# Patient Record
Sex: FEMALE | Race: WHITE
[De-identification: names, ages, dates, MRNs, and addresses within clinical notes are randomized per-mention and may not be internally consistent; named-entity substitution may affect disease eponyms.]

---

## 2020-07-21 ENCOUNTER — HOSPITAL ENCOUNTER (INPATIENT)
Dept: HOSPITAL 75 - LDRP | Age: 25
LOS: 2 days | Discharge: HOME | End: 2020-07-23
Attending: OBSTETRICS & GYNECOLOGY | Admitting: OBSTETRICS & GYNECOLOGY
Payer: COMMERCIAL

## 2020-07-21 VITALS — DIASTOLIC BLOOD PRESSURE: 70 MMHG | SYSTOLIC BLOOD PRESSURE: 112 MMHG

## 2020-07-21 VITALS — DIASTOLIC BLOOD PRESSURE: 80 MMHG | SYSTOLIC BLOOD PRESSURE: 117 MMHG

## 2020-07-21 VITALS — DIASTOLIC BLOOD PRESSURE: 75 MMHG | SYSTOLIC BLOOD PRESSURE: 116 MMHG

## 2020-07-21 VITALS — DIASTOLIC BLOOD PRESSURE: 72 MMHG | SYSTOLIC BLOOD PRESSURE: 111 MMHG

## 2020-07-21 VITALS — SYSTOLIC BLOOD PRESSURE: 98 MMHG | DIASTOLIC BLOOD PRESSURE: 52 MMHG

## 2020-07-21 VITALS — SYSTOLIC BLOOD PRESSURE: 134 MMHG | DIASTOLIC BLOOD PRESSURE: 80 MMHG

## 2020-07-21 VITALS — SYSTOLIC BLOOD PRESSURE: 114 MMHG | DIASTOLIC BLOOD PRESSURE: 75 MMHG

## 2020-07-21 VITALS — SYSTOLIC BLOOD PRESSURE: 110 MMHG | DIASTOLIC BLOOD PRESSURE: 65 MMHG

## 2020-07-21 VITALS — SYSTOLIC BLOOD PRESSURE: 113 MMHG | DIASTOLIC BLOOD PRESSURE: 72 MMHG

## 2020-07-21 VITALS — DIASTOLIC BLOOD PRESSURE: 62 MMHG | SYSTOLIC BLOOD PRESSURE: 146 MMHG

## 2020-07-21 VITALS — DIASTOLIC BLOOD PRESSURE: 56 MMHG | SYSTOLIC BLOOD PRESSURE: 144 MMHG

## 2020-07-21 VITALS — SYSTOLIC BLOOD PRESSURE: 109 MMHG | DIASTOLIC BLOOD PRESSURE: 71 MMHG

## 2020-07-21 VITALS — DIASTOLIC BLOOD PRESSURE: 58 MMHG | SYSTOLIC BLOOD PRESSURE: 104 MMHG

## 2020-07-21 VITALS — SYSTOLIC BLOOD PRESSURE: 112 MMHG | DIASTOLIC BLOOD PRESSURE: 56 MMHG

## 2020-07-21 VITALS — SYSTOLIC BLOOD PRESSURE: 106 MMHG | DIASTOLIC BLOOD PRESSURE: 71 MMHG

## 2020-07-21 VITALS — SYSTOLIC BLOOD PRESSURE: 135 MMHG | DIASTOLIC BLOOD PRESSURE: 61 MMHG

## 2020-07-21 VITALS — SYSTOLIC BLOOD PRESSURE: 149 MMHG | DIASTOLIC BLOOD PRESSURE: 76 MMHG

## 2020-07-21 VITALS — DIASTOLIC BLOOD PRESSURE: 78 MMHG | SYSTOLIC BLOOD PRESSURE: 113 MMHG

## 2020-07-21 VITALS — SYSTOLIC BLOOD PRESSURE: 100 MMHG | DIASTOLIC BLOOD PRESSURE: 66 MMHG

## 2020-07-21 VITALS — SYSTOLIC BLOOD PRESSURE: 126 MMHG | DIASTOLIC BLOOD PRESSURE: 60 MMHG

## 2020-07-21 VITALS — HEIGHT: 60.98 IN | WEIGHT: 160.28 LBS | BODY MASS INDEX: 30.26 KG/M2

## 2020-07-21 VITALS — SYSTOLIC BLOOD PRESSURE: 118 MMHG | DIASTOLIC BLOOD PRESSURE: 75 MMHG

## 2020-07-21 VITALS — SYSTOLIC BLOOD PRESSURE: 115 MMHG | DIASTOLIC BLOOD PRESSURE: 78 MMHG

## 2020-07-21 VITALS — DIASTOLIC BLOOD PRESSURE: 60 MMHG | SYSTOLIC BLOOD PRESSURE: 104 MMHG

## 2020-07-21 VITALS — DIASTOLIC BLOOD PRESSURE: 63 MMHG | SYSTOLIC BLOOD PRESSURE: 160 MMHG

## 2020-07-21 VITALS — SYSTOLIC BLOOD PRESSURE: 126 MMHG | DIASTOLIC BLOOD PRESSURE: 56 MMHG

## 2020-07-21 VITALS — SYSTOLIC BLOOD PRESSURE: 109 MMHG | DIASTOLIC BLOOD PRESSURE: 77 MMHG

## 2020-07-21 VITALS — SYSTOLIC BLOOD PRESSURE: 117 MMHG | DIASTOLIC BLOOD PRESSURE: 76 MMHG

## 2020-07-21 VITALS — DIASTOLIC BLOOD PRESSURE: 66 MMHG | SYSTOLIC BLOOD PRESSURE: 105 MMHG

## 2020-07-21 VITALS — DIASTOLIC BLOOD PRESSURE: 72 MMHG | SYSTOLIC BLOOD PRESSURE: 118 MMHG

## 2020-07-21 VITALS — DIASTOLIC BLOOD PRESSURE: 78 MMHG | SYSTOLIC BLOOD PRESSURE: 115 MMHG

## 2020-07-21 VITALS — SYSTOLIC BLOOD PRESSURE: 109 MMHG | DIASTOLIC BLOOD PRESSURE: 74 MMHG

## 2020-07-21 VITALS — SYSTOLIC BLOOD PRESSURE: 116 MMHG | DIASTOLIC BLOOD PRESSURE: 62 MMHG

## 2020-07-21 VITALS — SYSTOLIC BLOOD PRESSURE: 121 MMHG | DIASTOLIC BLOOD PRESSURE: 68 MMHG

## 2020-07-21 VITALS — SYSTOLIC BLOOD PRESSURE: 102 MMHG | DIASTOLIC BLOOD PRESSURE: 62 MMHG

## 2020-07-21 VITALS — DIASTOLIC BLOOD PRESSURE: 70 MMHG | SYSTOLIC BLOOD PRESSURE: 118 MMHG

## 2020-07-21 VITALS — SYSTOLIC BLOOD PRESSURE: 112 MMHG | DIASTOLIC BLOOD PRESSURE: 72 MMHG

## 2020-07-21 VITALS — DIASTOLIC BLOOD PRESSURE: 87 MMHG | SYSTOLIC BLOOD PRESSURE: 122 MMHG

## 2020-07-21 VITALS — SYSTOLIC BLOOD PRESSURE: 100 MMHG | DIASTOLIC BLOOD PRESSURE: 61 MMHG

## 2020-07-21 VITALS — DIASTOLIC BLOOD PRESSURE: 62 MMHG | SYSTOLIC BLOOD PRESSURE: 101 MMHG

## 2020-07-21 VITALS — SYSTOLIC BLOOD PRESSURE: 121 MMHG | DIASTOLIC BLOOD PRESSURE: 82 MMHG

## 2020-07-21 DIAGNOSIS — Z3A.38: ICD-10-CM

## 2020-07-21 LAB
BASOPHILS # BLD AUTO: 0 10^3/UL (ref 0–0.1)
BASOPHILS NFR BLD AUTO: 1 % (ref 0–10)
EOSINOPHIL # BLD AUTO: 0.2 10^3/UL (ref 0–0.3)
EOSINOPHIL NFR BLD AUTO: 2 % (ref 0–10)
ERYTHROCYTE [DISTWIDTH] IN BLOOD BY AUTOMATED COUNT: 14.8 % (ref 10–14.5)
HCT VFR BLD CALC: 37 % (ref 35–52)
HGB BLD-MCNC: 12.3 G/DL (ref 11.5–16)
LYMPHOCYTES # BLD AUTO: 2.3 X 10^3 (ref 1–4)
LYMPHOCYTES NFR BLD AUTO: 28 % (ref 12–44)
MANUAL DIFFERENTIAL PERFORMED BLD QL: NO
MCH RBC QN AUTO: 28 PG (ref 25–34)
MCHC RBC AUTO-ENTMCNC: 33 G/DL (ref 32–36)
MCV RBC AUTO: 83 FL (ref 80–99)
MONOCYTES # BLD AUTO: 0.6 X 10^3 (ref 0–1)
MONOCYTES NFR BLD AUTO: 8 % (ref 0–12)
NEUTROPHILS # BLD AUTO: 5.1 X 10^3 (ref 1.8–7.8)
NEUTROPHILS NFR BLD AUTO: 62 % (ref 42–75)
PLATELET # BLD: 119 10^3/UL (ref 130–400)
PMV BLD AUTO: (no result) FL (ref 7.4–10.4)
WBC # BLD AUTO: 8.2 10^3/UL (ref 4.3–11)

## 2020-07-21 PROCEDURE — 82947 ASSAY GLUCOSE BLOOD QUANT: CPT

## 2020-07-21 PROCEDURE — 36415 COLL VENOUS BLD VENIPUNCTURE: CPT

## 2020-07-21 PROCEDURE — 0HQ9XZZ REPAIR PERINEUM SKIN, EXTERNAL APPROACH: ICD-10-PCS | Performed by: OBSTETRICS & GYNECOLOGY

## 2020-07-21 PROCEDURE — 86900 BLOOD TYPING SEROLOGIC ABO: CPT

## 2020-07-21 PROCEDURE — 90715 TDAP VACCINE 7 YRS/> IM: CPT

## 2020-07-21 PROCEDURE — 0UQGXZZ REPAIR VAGINA, EXTERNAL APPROACH: ICD-10-PCS | Performed by: OBSTETRICS & GYNECOLOGY

## 2020-07-21 PROCEDURE — 3E033VJ INTRODUCTION OF OTHER HORMONE INTO PERIPHERAL VEIN, PERCUTANEOUS APPROACH: ICD-10-PCS | Performed by: OBSTETRICS & GYNECOLOGY

## 2020-07-21 PROCEDURE — 86901 BLOOD TYPING SEROLOGIC RH(D): CPT

## 2020-07-21 PROCEDURE — 86850 RBC ANTIBODY SCREEN: CPT

## 2020-07-21 PROCEDURE — 85025 COMPLETE CBC W/AUTO DIFF WBC: CPT

## 2020-07-21 PROCEDURE — 82962 GLUCOSE BLOOD TEST: CPT

## 2020-07-21 RX ADMIN — DOCUSATE SODIUM SCH MG: 100 CAPSULE ORAL at 20:33

## 2020-07-21 RX ADMIN — Medication SCH ML: at 21:02

## 2020-07-21 RX ADMIN — KETOROLAC TROMETHAMINE SCH MG: 30 INJECTION, SOLUTION INTRAMUSCULAR; INTRAVENOUS at 23:13

## 2020-07-21 NOTE — NUR
fundal massage U/1. several clots expressed with massage. moderate to heavy flow.  assisted 
into w/c, transferred to pp room 312 via w/c with s/o and baby.  a/o x3 talking with staff 
and s/o.  denies pain. assisted into bathroom with standby assistance. gait slow but steady.

## 2020-07-21 NOTE — NUR
KATE RODRIGUEZ admitted to room 3316-1, with an admitting diagnosis of induction of labor, 
on 07/21/20 from home, ambulated to OB floor accompanied by s/o .KATE RODRIGUEZ introduced 
to surroundings, call light, bed controls, phone, TV, temperature control, lights, meal 
times, smoking policy, visitor policy, side rail policy, bathrooms and showers.  Patient 
Rights given to patient in the handbook. KATE RODRIGUEZ verbalizes understanding that [facility] is not responsible for the loss or damage to any personal effects or valuables that 
are kept in the patients posession during their hospitalization.  The following Patient Care 
Plans were discussed with the patient: Discharge Planning, labor plan of care, pain 
management, and postpartum care. KATE RODRIGUEZ verbalizes understanding of 
Interdisciplinary Patient Education. Patient and/or family were informed about the Rapid 
Response Team and its purpose.

## 2020-07-21 NOTE — NUR
dr assessing perineum for further tears/lacerations, lidocaine administered to perineum by 
dr fonseca .

## 2020-07-21 NOTE — HISTORY & PHYSICAL
History and Physical


Date Seen by Provider:  2020


Time Seen by Provider:  07:46


This patient is a 24-year-old  1 white female at [address] [place] [address].  She presents

with plans for induction of labor due to gestational diabetes.  Pregnancy has 

been uncomplicated to date except for gestational diabetes that has been well 

controlled with diet.  Her GBS culture at 35 weeks gestation positive.  Patient 

denies rupture membranes or bleeding.





Allergies are none





Medications are prenatal vitamin medical social and surgical histories are per 

the antepartum record





HEENT exam is normal


Neck is supple no lymphadenopathy no thyromegaly


Abdomen gravid soft nontender nondistended


Extremities show no clubbing cyanosis.  There is no Homans sign.





Pelvic exam is pending





Assessment and plan   38 week gestation with a patient with gestational diabetes

and a GBS positive culture.  Plan is for induction of labor under the cover of 

ampicillin for GBS prophylaxis.  Anticipate vaginal delivery.


38 weeks with gestational diabetes admitted for induction of labor





Allergies and Home Medications


Allergies


Coded Allergies:  


     No Known Drug Allergies (Unverified , 20)





Patient Home Medication List


Home Medication List Reviewed:  Yes











GERI BISWAS MD       2020 07:47

## 2020-07-21 NOTE — XMS REPORT
[facility]

                             Created on: 05/15/2020



Sarai Carpenter

External Reference #: [id number]

: 1995

Sex: Female



Demographics





                          Address                   [address] [place]  [address]

 

                          Preferred Language        Unknown

 

                          Marital Status            Unknown

 

                          Sabianism Affiliation     Unknown

 

                          Race                      Unknown

 

                          Ethnic Group              Unknown





Author





                          Author                    Sarai Ledezma

 

                          Organization              [facility]

 

                          Address                   [address] [place]  [address]



 

                          Phone                     [phone]







Care Team Providers





                    Care Team Member Name Role                Phone

 

                    ZULMA Ledezma  Unavailable         [phone]







PROBLEMS





          Type      Condition ICD9-CM Code QSN05-WH Code Onset Dates Condition S

tatus SNOMED 

Code

 

          Problem   Constipation, unspecified constipation type           K59.00

              Active    89159228

 

          Problem   Amenorrhea           N91.2               Active    [id number]







ALLERGIES

No Information



ENCOUNTERS





                Encounter       Location        Date            Diagnosis

 

                          [facility]  [address] [id number]

[address] [place] [address] 

[id number]                09 Dec, 2019              Amenorrhea N91.2

 

                          [facility]  [address] [id number]

[address] [place] [address] 

[id number]                              Flank pain R10.9 ; Acute cys

titis with hematuria N30.01 

and Constipation, unspecified constipation type K59.00

 

                          [facility]  [address] [id number]

[address] [place] [address] 

[id number]                              Left flank pain R10.9 and Ga

stroenteritis K52.9

 

                          [facility]     [address] [id number]

[address] [place] [id number]

                                         







IMMUNIZATIONS

No Known Immunizations



SOCIAL HISTORY

Never Assessed



REASON FOR VISIT





PLAN OF CARE





VITAL SIGNS





MEDICATIONS

No Known Medications



RESULTS

No Results



PROCEDURES

No Known procedures



INSTRUCTIONS





MEDICATIONS ADMINISTERED

No Known Medications



MEDICAL (GENERAL) HISTORY





                    Type                Description         Date

 

                    Surgical History    No know Surgical history

## 2020-07-21 NOTE — XMS REPORT
Continuity of Care Document

                             Created on: 2020



Sarai Carpenter

External Reference #: [id number]

: 1995

Sex: Female



Demographics





                          Address                   [address] [place] [address]  [address]

 

                          Home Phone                [phone] x

 

                          Preferred Language        Unknown

 

                          Marital Status            Unknown

 

                          Pentecostal Affiliation     Unknown

 

                          Race                      Unknown

 

                          Ethnic Group              Unknown





Author





                          Organization              Unknown

 

                          Address                   Unknown

 

                          Phone                     Unavailable



              



Allergies

      



There is no data.                  



Medications

      



There is no data.                  



Problems

      



There is no data.                  



Procedures

      



There is no data.                  



Results

      



There is no data.              



Encounters

      



                ACCT No.           Visit Date/Time           Discharge          

 Status         

             Pt. Type           Provider           Facility           Loc./Unit 

          

Complaint        

 

                [id number]           2019 14:20:00           2019 23:59:5

9           [facility] 

                Outpatient           [facility]

## 2020-07-21 NOTE — NUR
dr fonseca notified of events while patient up to bathroom. vitals reviewed, 
neurological status reviewed.  new orders received. will continue to monitor.

## 2020-07-22 VITALS — SYSTOLIC BLOOD PRESSURE: 104 MMHG | DIASTOLIC BLOOD PRESSURE: 56 MMHG

## 2020-07-22 VITALS — DIASTOLIC BLOOD PRESSURE: 57 MMHG | SYSTOLIC BLOOD PRESSURE: 96 MMHG

## 2020-07-22 VITALS — DIASTOLIC BLOOD PRESSURE: 62 MMHG | SYSTOLIC BLOOD PRESSURE: 104 MMHG

## 2020-07-22 VITALS — DIASTOLIC BLOOD PRESSURE: 47 MMHG | SYSTOLIC BLOOD PRESSURE: 84 MMHG

## 2020-07-22 RX ADMIN — KETOROLAC TROMETHAMINE SCH MG: 30 INJECTION, SOLUTION INTRAMUSCULAR; INTRAVENOUS at 06:04

## 2020-07-22 RX ADMIN — DOCUSATE SODIUM SCH MG: 100 CAPSULE ORAL at 08:44

## 2020-07-22 RX ADMIN — DOCUSATE SODIUM SCH MG: 100 CAPSULE ORAL at 21:20

## 2020-07-22 RX ADMIN — Medication SCH ML: at 06:07

## 2020-07-22 RX ADMIN — KETOROLAC TROMETHAMINE SCH MG: 30 INJECTION, SOLUTION INTRAMUSCULAR; INTRAVENOUS at 20:01

## 2020-07-22 NOTE — ANESTHESIA-REGIONAL POST-OP
Regional


Patient Condition


Mental Status:  Alert, Oriented x3


Circulation:  Same as Pre-Op


Headache:  Absent


Sensation:  Full Recovery


Motor Block:  Absent





Post Op Complications


Complications


None





Follow Up Care/Instructions


Patient Instructions


None needed.





Anesthesia/Patient Condition


Patient is doing well, no complaints, stable vital signs, no apparent adverse 

anesthesia problems.   


No complications reported per nursing.











GUZMAN STILL CRNA            Jul 22, 2020 07:49

## 2020-07-22 NOTE — PROGRESS NOTE
Standard Progress Note


Progress Notes/Assess & Plan


Date Seen by a Provider:  Jul 22, 2020


Time Seen by a Provider:  07:48


Progress/Assessment & Plan


This patient is without complaint.  She is ambulating, voiding, tolerating oral 

intake well has good pain control.








Vital Signs








 7/21/20 7/22/20





 23:13 04:20


 


Temp  35.9


 


Pulse  74


 


Resp  18


 


B/P (MAP)  96/57 (70)


 


Pulse Ox 97 


 


O2 Delivery  Room Air





Vital signs are stable.  Patient is afebrile.





Fundus is firm below the umbilicus and nontender.


Extremities show no clubbing cyanosis.  There is no Homans sign.





Assessment and plan   was partum day 1 status post term spontaneous vaginal 

delivery at 38 weeks gestation.  Patient doing well will have routine 

convalescence care


Final Diagnosis


38 week spontaneous vaginal delivery











GERI BISWAS MD       Jul 22, 2020 07:48

## 2020-07-22 NOTE — OPERATIVE REPORT
DATE OF SERVICE:  07/21/2020



DELIVERY NOTE



The patient delivered by term spontaneous vaginal delivery, a viable female

infant with Apgars of 8 and 9 at 1 and 5 minutes respectively, weight 7 pounds

15 ounces.  Cord blood pH that is pending and a birth time of 1510.  The infant

was delivered over an episiotomy that was performed at the patient's request. 

The patient has specifically requested episiotomy when she could feel her bottom

tearing as the infant was trying to deliver over the perineum.  Episiotomy was

then performed at her request and the delivery completed without further

inherent trauma.



The infant was bulb suctioned on delivery of the head and again on completion of

delivery.  Umbilical cord was doubly clamped, father cut the cord, the baby was

passed to mom's abdomen.



Cord bloods were obtained.  The placenta delivered spontaneously Chowdhury.  It

was normal with a 3-vessel cord.  The cervix, rectum, vagina and perineum were

examined and found intact, except for the episiotomy and several superficial

lacerations involving the hymenal ring and the posterior vaginal wall.  The

episiotomy was repaired in the usual manner with a single suture of 3-0 Vicryl

Rapide.  The lacerations were repaired concurrent with that repair as well. 

There were several small superficial abrasions periurethrally located as well. 

These were hemostatic and did not require repair.



Sponge and needle counts were correct on completion of delivery and the repair. 

Estimated blood loss was around 350 mL.  The patient tolerated the delivery well

and was recovered in the LDR.  The baby remained with the mom.





Job ID: [id number]

DocumentID: [id number]

Dictated Date:  07/22/2020 07:55:42

Transcription Date: 07/22/2020 12:21:06

Dictated By: GERI BISWAS MD

## 2020-07-22 NOTE — DISCHARGE INST-SURGICAL
Discharge Inst-Surgical


Depart Medication/Instructions


New, Converted or Re-Newed RX:  RX on Chart





Consults/Follow Up


Patient Instructions:  


As directed


Orders & Referrals





Follow Up Appt:


Call to make follow up appt. for patient in 4 weeks.





Activity 


Per routine post vaginal delivery instructions.





Please call in RX to patient pharmacy.





Diet as tolerated





Patient may shower or tub bathe as desired.





Activity


Activity as Tolerated:  No





Diet


Discharge Diet:  No Restrictions











GERI BISWAS MD       Jul 22, 2020 07:51

## 2020-07-23 VITALS — DIASTOLIC BLOOD PRESSURE: 52 MMHG | SYSTOLIC BLOOD PRESSURE: 99 MMHG

## 2020-07-23 VITALS — DIASTOLIC BLOOD PRESSURE: 73 MMHG | SYSTOLIC BLOOD PRESSURE: 112 MMHG

## 2020-07-23 VITALS — DIASTOLIC BLOOD PRESSURE: 53 MMHG | SYSTOLIC BLOOD PRESSURE: 101 MMHG

## 2020-07-23 VITALS — SYSTOLIC BLOOD PRESSURE: 112 MMHG | DIASTOLIC BLOOD PRESSURE: 73 MMHG

## 2020-07-23 VITALS — SYSTOLIC BLOOD PRESSURE: 96 MMHG | DIASTOLIC BLOOD PRESSURE: 55 MMHG

## 2020-07-23 VITALS — DIASTOLIC BLOOD PRESSURE: 48 MMHG | SYSTOLIC BLOOD PRESSURE: 87 MMHG

## 2020-07-23 RX ADMIN — FERROUS SULFATE TAB 325 MG (65 MG ELEMENTAL FE) SCH MG: 325 (65 FE) TAB at 17:55

## 2020-07-23 RX ADMIN — FERROUS SULFATE TAB 325 MG (65 MG ELEMENTAL FE) SCH MG: 325 (65 FE) TAB at 09:56

## 2020-07-23 RX ADMIN — FERROUS SULFATE TAB 325 MG (65 MG ELEMENTAL FE) SCH MG: 325 (65 FE) TAB at 00:53

## 2020-07-23 RX ADMIN — IBUPROFEN SCH MG: 800 TABLET, FILM COATED ORAL at 00:53

## 2020-07-23 RX ADMIN — DOCUSATE SODIUM SCH MG: 100 CAPSULE ORAL at 09:56

## 2020-07-23 RX ADMIN — IBUPROFEN SCH MG: 800 TABLET, FILM COATED ORAL at 17:55

## 2020-07-23 RX ADMIN — IBUPROFEN SCH MG: 800 TABLET, FILM COATED ORAL at 06:20

## 2020-07-23 RX ADMIN — IBUPROFEN SCH MG: 800 TABLET, FILM COATED ORAL at 12:06

## 2020-07-23 NOTE — NUR
PT DISMISSED FROM WS IN STABLE CONDITION. PT WILL REMAIN IN PT ROOM R/T INFANT HAVING TO 
STAY IN HOSPITAL.

## 2020-07-23 NOTE — PROGRESS NOTE
Standard Progress Note


Progress Notes/Assess & Plan


Date Seen by a Provider:  Jul 23, 2020


Time Seen by a Provider:  07:57


Progress/Assessment & Plan


This patient is without complaint.  She is ambulating, voiding, tolerating oral 

intake well has good pain control.








Vital Signs








 7/21/20 7/22/20





 23:13 04:20


 


Temp  35.9


 


Pulse  74


 


Resp  18


 


B/P (MAP)  96/57 (70)


 


Pulse Ox 97 


 


O2 Delivery  Room Air





Vital signs are stable.  Patient is afebrile.





Fundus is firm below the umbilicus and nontender.


Extremities show no clubbing cyanosis.  There is no Homans sign.





Assessment and plan   was partum day 1 status post term spontaneous vaginal 

delivery at 38 weeks gestation.  Patient doing well will have routine 

convalescence care





July 23, 2020


Patient is without complaint.  She is ambulating, voiding, tolerating oral 

intake well and requesting discharge home.








Vital Signs








  Date Time  Temp Pulse Resp B/P (MAP) Pulse Ox O2 Delivery O2 Flow Rate FiO2


 


7/23/20 06:20 36.4 75 18 87/48 (61) 99 Room Air  


 


7/23/20 00:53 37.0 71 18 96/55 (69) 98 Room Air  


 


7/22/20 18:30 37.1 78 18 104/56 (72)  Room Air  


 


7/22/20 12:20 37.2 68 18 104/62 (76) 98 Room Air  


 


7/22/20 08:45 37.0 66 18 84/47 (59) 98 Room Air  





Vital signs are stable.  Patient is afebrile.





The abdomen is benign.  Fundus firm below the umbilicus is nontender.


Extremities show no clubbing or cyanosis.  There is no Homans sign.





Assessment and plan      postpartum day number 2 status post term spontaneous 

vaginal delivery at 38 weeks gestation.  Patient is doing well and will be 

discharged home with follow-up in clinic


Final Diagnosis


38 week spontaneous vaginal delivery











GERI BISWAS MD       Jul 23, 2020 07:58

## 2022-01-12 ENCOUNTER — HOSPITAL ENCOUNTER (OUTPATIENT)
Dept: HOSPITAL 75 - RAD | Age: 27
End: 2022-01-12
Attending: NURSE PRACTITIONER
Payer: COMMERCIAL

## 2022-01-12 DIAGNOSIS — Z34.82: Primary | ICD-10-CM

## 2022-01-12 DIAGNOSIS — Z3A.30: ICD-10-CM

## 2022-01-12 PROCEDURE — 76805 OB US >/= 14 WKS SNGL FETUS: CPT

## 2022-01-12 NOTE — DIAGNOSTIC IMAGING REPORT
INDICATION: Fetal survey.



TECHNIQUE: Multiple real-time grayscale images were obtained over

the gravid uterus.



COMPARISON: None



FINDINGS: There is a single live fetus in a cephalic

presentation. Fetal heart rate was recorded at 146 bpm. Placenta

is posterior. Amniotic fluid volume appears normal. Cervical

length is 4.5 cm. Fetal kidneys, bladder and stomach are

unremarkable. Fetal brain is unremarkable. There is a

four-chamber heart. There is three-vessel cord with normal

insertion. Fetal spine is unremarkable.



Biometrical measurements are as follows:

Biparietal 7.56 cm, age 30 weeks 3 days.

Head circumference 28.16 cm, age 31 weeks 0 days.

Abdominal circumference 26.83 cm, age 31 weeks 0 days.

Femur length 5.86 cm, age 30 weeks 5 days.



Sonographic estimate age: 30 weeks 6 days.

Sonographic estimated date of delivery: 03/17/2022.



Estimated Fetal Weight: 1636 gm (+/- 239  gm).

LMP percentile: 70%.



Fetal heart rate: 146 beats per minute.



Fetal number: 1 of 1.



IMPRESSION: Single live IUP 30 weeks 6 days gestational age.

Estimated of confinement sonographic is 03/17/2022.



Dictated by: 



  Dictated on workstation # [id number]

## 2022-03-10 ENCOUNTER — HOSPITAL ENCOUNTER (INPATIENT)
Dept: HOSPITAL 75 - LDRP | Age: 27
LOS: 2 days | Discharge: HOME | End: 2022-03-12
Attending: OBSTETRICS & GYNECOLOGY | Admitting: OBSTETRICS & GYNECOLOGY
Payer: COMMERCIAL

## 2022-03-10 VITALS — SYSTOLIC BLOOD PRESSURE: 105 MMHG | DIASTOLIC BLOOD PRESSURE: 64 MMHG

## 2022-03-10 VITALS — SYSTOLIC BLOOD PRESSURE: 111 MMHG | DIASTOLIC BLOOD PRESSURE: 76 MMHG

## 2022-03-10 VITALS — DIASTOLIC BLOOD PRESSURE: 65 MMHG | SYSTOLIC BLOOD PRESSURE: 109 MMHG

## 2022-03-10 VITALS — SYSTOLIC BLOOD PRESSURE: 103 MMHG | DIASTOLIC BLOOD PRESSURE: 65 MMHG

## 2022-03-10 VITALS — DIASTOLIC BLOOD PRESSURE: 56 MMHG | SYSTOLIC BLOOD PRESSURE: 115 MMHG

## 2022-03-10 VITALS — DIASTOLIC BLOOD PRESSURE: 63 MMHG | SYSTOLIC BLOOD PRESSURE: 118 MMHG

## 2022-03-10 VITALS — DIASTOLIC BLOOD PRESSURE: 82 MMHG | SYSTOLIC BLOOD PRESSURE: 121 MMHG

## 2022-03-10 VITALS — WEIGHT: 154.1 LBS | BODY MASS INDEX: 29.09 KG/M2 | HEIGHT: 60.98 IN

## 2022-03-10 VITALS — SYSTOLIC BLOOD PRESSURE: 110 MMHG | DIASTOLIC BLOOD PRESSURE: 58 MMHG

## 2022-03-10 VITALS — DIASTOLIC BLOOD PRESSURE: 68 MMHG | SYSTOLIC BLOOD PRESSURE: 115 MMHG

## 2022-03-10 VITALS — SYSTOLIC BLOOD PRESSURE: 107 MMHG | DIASTOLIC BLOOD PRESSURE: 76 MMHG

## 2022-03-10 VITALS — DIASTOLIC BLOOD PRESSURE: 66 MMHG | SYSTOLIC BLOOD PRESSURE: 115 MMHG

## 2022-03-10 VITALS — DIASTOLIC BLOOD PRESSURE: 70 MMHG | SYSTOLIC BLOOD PRESSURE: 114 MMHG

## 2022-03-10 VITALS — DIASTOLIC BLOOD PRESSURE: 67 MMHG | SYSTOLIC BLOOD PRESSURE: 112 MMHG

## 2022-03-10 VITALS — SYSTOLIC BLOOD PRESSURE: 117 MMHG | DIASTOLIC BLOOD PRESSURE: 67 MMHG

## 2022-03-10 VITALS — SYSTOLIC BLOOD PRESSURE: 115 MMHG | DIASTOLIC BLOOD PRESSURE: 76 MMHG

## 2022-03-10 VITALS — SYSTOLIC BLOOD PRESSURE: 109 MMHG | DIASTOLIC BLOOD PRESSURE: 63 MMHG

## 2022-03-10 VITALS — DIASTOLIC BLOOD PRESSURE: 63 MMHG | SYSTOLIC BLOOD PRESSURE: 130 MMHG

## 2022-03-10 VITALS — SYSTOLIC BLOOD PRESSURE: 105 MMHG | DIASTOLIC BLOOD PRESSURE: 60 MMHG

## 2022-03-10 VITALS — DIASTOLIC BLOOD PRESSURE: 79 MMHG | SYSTOLIC BLOOD PRESSURE: 121 MMHG

## 2022-03-10 VITALS — DIASTOLIC BLOOD PRESSURE: 68 MMHG | SYSTOLIC BLOOD PRESSURE: 117 MMHG

## 2022-03-10 VITALS — SYSTOLIC BLOOD PRESSURE: 103 MMHG | DIASTOLIC BLOOD PRESSURE: 59 MMHG

## 2022-03-10 VITALS — SYSTOLIC BLOOD PRESSURE: 106 MMHG | DIASTOLIC BLOOD PRESSURE: 62 MMHG

## 2022-03-10 VITALS — DIASTOLIC BLOOD PRESSURE: 77 MMHG | SYSTOLIC BLOOD PRESSURE: 118 MMHG

## 2022-03-10 VITALS — SYSTOLIC BLOOD PRESSURE: 118 MMHG | DIASTOLIC BLOOD PRESSURE: 70 MMHG

## 2022-03-10 VITALS — DIASTOLIC BLOOD PRESSURE: 63 MMHG | SYSTOLIC BLOOD PRESSURE: 107 MMHG

## 2022-03-10 VITALS — DIASTOLIC BLOOD PRESSURE: 83 MMHG | SYSTOLIC BLOOD PRESSURE: 123 MMHG

## 2022-03-10 VITALS — DIASTOLIC BLOOD PRESSURE: 55 MMHG | SYSTOLIC BLOOD PRESSURE: 116 MMHG

## 2022-03-10 VITALS — DIASTOLIC BLOOD PRESSURE: 62 MMHG | SYSTOLIC BLOOD PRESSURE: 127 MMHG

## 2022-03-10 VITALS — DIASTOLIC BLOOD PRESSURE: 61 MMHG | SYSTOLIC BLOOD PRESSURE: 126 MMHG

## 2022-03-10 VITALS — DIASTOLIC BLOOD PRESSURE: 55 MMHG | SYSTOLIC BLOOD PRESSURE: 115 MMHG

## 2022-03-10 VITALS — DIASTOLIC BLOOD PRESSURE: 65 MMHG | SYSTOLIC BLOOD PRESSURE: 115 MMHG

## 2022-03-10 VITALS — SYSTOLIC BLOOD PRESSURE: 113 MMHG | DIASTOLIC BLOOD PRESSURE: 65 MMHG

## 2022-03-10 VITALS — SYSTOLIC BLOOD PRESSURE: 110 MMHG | DIASTOLIC BLOOD PRESSURE: 72 MMHG

## 2022-03-10 VITALS — DIASTOLIC BLOOD PRESSURE: 63 MMHG | SYSTOLIC BLOOD PRESSURE: 110 MMHG

## 2022-03-10 VITALS — DIASTOLIC BLOOD PRESSURE: 68 MMHG | SYSTOLIC BLOOD PRESSURE: 113 MMHG

## 2022-03-10 VITALS — DIASTOLIC BLOOD PRESSURE: 98 MMHG | SYSTOLIC BLOOD PRESSURE: 137 MMHG

## 2022-03-10 VITALS — SYSTOLIC BLOOD PRESSURE: 105 MMHG | DIASTOLIC BLOOD PRESSURE: 63 MMHG

## 2022-03-10 VITALS — SYSTOLIC BLOOD PRESSURE: 115 MMHG | DIASTOLIC BLOOD PRESSURE: 70 MMHG

## 2022-03-10 VITALS — DIASTOLIC BLOOD PRESSURE: 69 MMHG | SYSTOLIC BLOOD PRESSURE: 111 MMHG

## 2022-03-10 VITALS — DIASTOLIC BLOOD PRESSURE: 80 MMHG | SYSTOLIC BLOOD PRESSURE: 117 MMHG

## 2022-03-10 VITALS — SYSTOLIC BLOOD PRESSURE: 103 MMHG | DIASTOLIC BLOOD PRESSURE: 60 MMHG

## 2022-03-10 VITALS — DIASTOLIC BLOOD PRESSURE: 71 MMHG | SYSTOLIC BLOOD PRESSURE: 111 MMHG

## 2022-03-10 VITALS — SYSTOLIC BLOOD PRESSURE: 110 MMHG | DIASTOLIC BLOOD PRESSURE: 56 MMHG

## 2022-03-10 VITALS — DIASTOLIC BLOOD PRESSURE: 65 MMHG | SYSTOLIC BLOOD PRESSURE: 110 MMHG

## 2022-03-10 VITALS — SYSTOLIC BLOOD PRESSURE: 107 MMHG | DIASTOLIC BLOOD PRESSURE: 58 MMHG

## 2022-03-10 VITALS — DIASTOLIC BLOOD PRESSURE: 57 MMHG | SYSTOLIC BLOOD PRESSURE: 109 MMHG

## 2022-03-10 VITALS — DIASTOLIC BLOOD PRESSURE: 65 MMHG | SYSTOLIC BLOOD PRESSURE: 112 MMHG

## 2022-03-10 VITALS — DIASTOLIC BLOOD PRESSURE: 78 MMHG | SYSTOLIC BLOOD PRESSURE: 130 MMHG

## 2022-03-10 VITALS — DIASTOLIC BLOOD PRESSURE: 68 MMHG | SYSTOLIC BLOOD PRESSURE: 107 MMHG

## 2022-03-10 VITALS — SYSTOLIC BLOOD PRESSURE: 106 MMHG | DIASTOLIC BLOOD PRESSURE: 60 MMHG

## 2022-03-10 VITALS — DIASTOLIC BLOOD PRESSURE: 59 MMHG | SYSTOLIC BLOOD PRESSURE: 122 MMHG

## 2022-03-10 DIAGNOSIS — Z3A.38: ICD-10-CM

## 2022-03-10 DIAGNOSIS — O40.3XX0: Primary | ICD-10-CM

## 2022-03-10 DIAGNOSIS — D69.6: ICD-10-CM

## 2022-03-10 LAB
BASOPHILS # BLD AUTO: 0 10^3/UL (ref 0–0.1)
BASOPHILS NFR BLD AUTO: 1 % (ref 0–10)
BLD SMEAR INTERP: YES
EOSINOPHIL # BLD AUTO: 0.1 10^3/UL (ref 0–0.3)
EOSINOPHIL NFR BLD AUTO: 2 % (ref 0–10)
EOSINOPHIL NFR BLD MANUAL: 1 %
HCT VFR BLD CALC: 49 % (ref 35–52)
HGB BLD-MCNC: 15.6 G/DL (ref 11.5–16)
LYMPHOCYTES # BLD AUTO: 2 10^3/UL (ref 1–4)
LYMPHOCYTES NFR BLD AUTO: 32 % (ref 12–44)
MANUAL DIFFERENTIAL PERFORMED BLD QL: YES
MCH RBC QN AUTO: 26 PG (ref 25–34)
MCHC RBC AUTO-ENTMCNC: 32 G/DL (ref 32–36)
MCV RBC AUTO: 82 FL (ref 80–99)
MONOCYTES # BLD AUTO: 0.4 10^3/UL (ref 0–1)
MONOCYTES NFR BLD AUTO: 7 % (ref 0–12)
MONOCYTES NFR BLD: 5 %
NEUTROPHILS # BLD AUTO: 3.5 10^3/UL (ref 1.8–7.8)
NEUTROPHILS NFR BLD AUTO: 58 % (ref 42–75)
NEUTS BAND NFR BLD MANUAL: 55 %
PLATELET # BLD EST: (no result) 10*3/UL
PLATELET # BLD: 82 10^3/UL (ref 130–400)
PLATELET CLUMP BLD QL SMEAR: (no result)
POLYCHROMASIA BLD QL SMEAR: (no result)
VARIANT LYMPHS NFR BLD MANUAL: 2 %
VARIANT LYMPHS NFR BLD MANUAL: 37 %
WBC # BLD AUTO: 6.1 10^3/UL (ref 4.3–11)

## 2022-03-10 PROCEDURE — 85049 AUTOMATED PLATELET COUNT: CPT

## 2022-03-10 PROCEDURE — 86901 BLOOD TYPING SEROLOGIC RH(D): CPT

## 2022-03-10 PROCEDURE — 85027 COMPLETE CBC AUTOMATED: CPT

## 2022-03-10 PROCEDURE — 36415 COLL VENOUS BLD VENIPUNCTURE: CPT

## 2022-03-10 PROCEDURE — 86900 BLOOD TYPING SEROLOGIC ABO: CPT

## 2022-03-10 PROCEDURE — 85007 BL SMEAR W/DIFF WBC COUNT: CPT

## 2022-03-10 PROCEDURE — 82947 ASSAY GLUCOSE BLOOD QUANT: CPT

## 2022-03-10 PROCEDURE — 86850 RBC ANTIBODY SCREEN: CPT

## 2022-03-10 RX ADMIN — DOCUSATE SODIUM SCH MG: 100 CAPSULE ORAL at 20:31

## 2022-03-10 RX ADMIN — Medication SCH ML: at 19:49

## 2022-03-10 RX ADMIN — KETOROLAC TROMETHAMINE SCH MG: 30 INJECTION, SOLUTION INTRAMUSCULAR; INTRAVENOUS at 17:11

## 2022-03-10 NOTE — HISTORY & PHYSICAL
History and Physical


Date Seen by Provider:  Mar 10, 2022


Time Seen by Provider:  12:00


This patient is a 26-year-old  2 para 1 female currently at 38 weeks 

gestation.  Her pregnancy is complicated by gestational diabetes and is noted on

admission labs today that she has thrombocytopenia as well.  Her pregnancy 

further is complicated by mild polyhydramnios.  Patient was admitted on this 

date for induction of labor.  Her GBS culture was positive and she will be 

induced under the cover of ampicillin for GBS prophylaxis





Allergies are none





Medications are prenatal vitamins





Medical social and surgical history is are per the antepartum record





HEENT exam is normal


Neck is supple no lymphadenopathy no thyromegaly


Abdomen is gravid soft nontender nondistended


Extremities show no clubbing cyanosis.  There is no Homans' sign.





Pelvic exam is pending





Lab work is as follows


Laboratory Tests








Test


 3/10/22


07:30 Range/Units


 


 


White Blood Count


 6.1 


 4.3-11.0


10^3/uL


 


Red Blood Count


 5.96 H


 3.80-5.11


10^6/uL


 


Hemoglobin 15.6  11.5-16.0  g/dL


 


Hematocrit 49  35-52  %


 


Mean Corpuscular Volume 82  80-99  fL


 


Mean Corpuscular Hemoglobin 26  25-34  pg


 


Mean Corpuscular Hemoglobin


Concent 32 


 32-36  g/dL





 


Red Cell Distribution Width 14.6 H 10.0-14.5  %


 


Platelet Count


 82 L


 130-400


10^3/uL


 


Mean Platelet Volume   9.0-12.2  fL


 


Immature Granulocyte % (Auto) 1   %


 


Neutrophils (%) (Auto) 58  42-75  %


 


Lymphocytes (%) (Auto) 32  12-44  %


 


Monocytes (%) (Auto) 7  0-12  %


 


Eosinophils (%) (Auto) 2  0-10  %


 


Basophils (%) (Auto) 1  0-10  %


 


Neutrophils # (Auto)


 3.5 


 1.8-7.8


10^3/uL


 


Lymphocytes # (Auto)


 2.0 


 1.0-4.0


10^3/uL


 


Monocytes # (Auto)


 0.4 


 0.0-1.0


10^3/uL


 


Eosinophils # (Auto)


 0.1 


 0.0-0.3


10^3/uL


 


Basophils # (Auto)


 0.0 


 0.0-0.1


10^3/uL


 


Immature Granulocyte # (Auto)


 0.0 


 0.0-0.1


10^3/uL


 


Neutrophils % (Manual) 55   %


 


Lymphocytes % (Manual) 37   %


 


Monocytes % (Manual) 5   %


 


Eosinophils % (Manual) 1   %


 


Atypical Lymphocytes 2   %


 


Platelet Estimate SEE COMMENT   


 


Clumped Platelets NONE SEEN   


 


Polychromasia MODERATE   


 


Glucose Level 80    MG/DL


 


Smear Scan YES   





Note is made of platelet count of 82,000





Assessment and plan


Term pregnancy at 38 weeks gestation with mild polyhydramnios and with 

gestational diabetes and now noted with thrombocytopenia.  Patient has a GBS 

positive culture and will be given ampicillin throughout her labor.  She is 

considering platelet transfusion to increase her platelets to an acceptable 

level for epidural which she desires.


We do anticipate a vaginal delivery


38 weeks with gestational diabetes and polyhydramnios





Allergies and Home Medications


Allergies


Coded Allergies:  


     No Known Drug Allergies (Unverified , 20)





Patient Home Medication List


Home Medication List Reviewed:  Yes


Docusate Sodium (Dok) 100 Mg Capsule, 100 MG PO BID


   Prescribed by: GERI BLEDSOE on 20 0750


Ibuprofen (Ibuprofen) 800 Mg Tablet, 800 MG PO Q6HR


   Prescribed by: GERI BLEDSOE on 20 0750


Oxycodone HCl/Acetaminophen (Percocet 5-325 mg Tablet) 1 Each Tablet, 1 TAB PO 

Q4H PRN for PAIN-MODERATE (5-7)


   Prescribed by: GERI BLEDSOE on 20 0750











GERI BISWAS MD       Mar 10, 2022 09:30

## 2022-03-11 VITALS — SYSTOLIC BLOOD PRESSURE: 105 MMHG | DIASTOLIC BLOOD PRESSURE: 67 MMHG

## 2022-03-11 VITALS — SYSTOLIC BLOOD PRESSURE: 108 MMHG | DIASTOLIC BLOOD PRESSURE: 66 MMHG

## 2022-03-11 VITALS — SYSTOLIC BLOOD PRESSURE: 110 MMHG | DIASTOLIC BLOOD PRESSURE: 66 MMHG

## 2022-03-11 VITALS — SYSTOLIC BLOOD PRESSURE: 99 MMHG | DIASTOLIC BLOOD PRESSURE: 59 MMHG

## 2022-03-11 VITALS — DIASTOLIC BLOOD PRESSURE: 67 MMHG | SYSTOLIC BLOOD PRESSURE: 101 MMHG

## 2022-03-11 PROCEDURE — 0HQ9XZZ REPAIR PERINEUM SKIN, EXTERNAL APPROACH: ICD-10-PCS | Performed by: OBSTETRICS & GYNECOLOGY

## 2022-03-11 RX ADMIN — Medication SCH ML: at 00:35

## 2022-03-11 RX ADMIN — DOCUSATE SODIUM SCH MG: 100 CAPSULE ORAL at 10:11

## 2022-03-11 RX ADMIN — KETOROLAC TROMETHAMINE SCH MG: 30 INJECTION, SOLUTION INTRAMUSCULAR; INTRAVENOUS at 00:32

## 2022-03-11 RX ADMIN — IBUPROFEN SCH MG: 800 TABLET, FILM COATED ORAL at 18:14

## 2022-03-11 RX ADMIN — IBUPROFEN SCH MG: 800 TABLET, FILM COATED ORAL at 06:35

## 2022-03-11 RX ADMIN — DOCUSATE SODIUM SCH MG: 100 CAPSULE ORAL at 21:00

## 2022-03-11 RX ADMIN — IBUPROFEN SCH MG: 800 TABLET, FILM COATED ORAL at 12:14

## 2022-03-11 NOTE — ANESTHESIA-REGIONAL POST-OP
Regional


Patient Condition


Mental Status:  Alert, Oriented x3


Circulation:  Same as Pre-Op


Headache:  Absent


Sensation:  Full Recovery


Motor Block:  Absent





Post Op Complications


Complications


None





Follow Up Care/Instructions


Patient Instructions


None needed.





Anesthesia/Patient Condition


Patient is doing well, no complaints, stable vital signs, no apparent adverse 

anesthesia problems.











MARICARMEN MENDEZ DO         Mar 11, 2022 10:59

## 2022-03-11 NOTE — OB LABOR & DELIVERY RECORD
Labor & Delivery


This patient had a term spontaneous vaginal delivery of a viable male infant 

with Apgars of 9 and 9 at 1 and 5 unexpected weight of 6 pounds and 15 ounces.  

Cord blood pH was 7.36. Delivery was uncomplicated.  Patient did sustain A right

periurethral laceration of first-degree extent and a first-degree perineal 

laceration both of which were repaired under local analgesia and the epidural 

with a single suture of 3-0 Vicryl Rapide.





The placenta delivered spontaneously Chowdhury it was normal with a three-vessel 

cord. Cervix vagina rectum were examined and found intact except for the 2 

lacerations noted above were repaired as noted above.





Sponge needle counts were correct on completion of delivery and repair blood 

loss was around 300 cc.











GERI BISWAS MD       Mar 11, 2022 12:40

## 2022-03-11 NOTE — PROGRESS NOTE
Standard Progress Note


Progress Notes/Assess & Plan


Date Seen by a Provider:  Mar 11, 2022


Time Seen by a Provider:  12:22


Progress/Assessment & Plan


This patient is without complaint.  She is ambulating, voiding, tolerating oral 

intake well and has good pain control.








                          VS - Last 72 Hours, by Label








 3/10/22 3/10/22 3/10/22 3/10/22





 07:24 07:48 09:49 09:54


 


Temp 36.2 36.2 36.8 37.3


 


Pulse 85 85 78 85


 


Resp 18 18 18 18


 


B/P (MAP) 107/76 (86)  103/59 105/60


 


Pulse Ox 98 98 97 97


 


O2 Delivery Room Air Room Air Room Air Room Air


 


    





 3/10/22 3/10/22 3/10/22 3/10/22





 10:04 10:22 10:26 10:42


 


Temp 37.2 37.1 37.0 


 


Pulse 71 79 73 76


 


Resp 18 18 18 18


 


B/P (MAP) 110/65 103/65 107/68 111/69 (83)


 


Pulse Ox 97 98 97 


 


O2 Delivery Room Air Room Air Room Air Room Air


 


    





 3/10/22 3/10/22 3/10/22 3/10/22





 10:58 11:12 11:28 11:42


 


Pulse 76 74 75 75


 


Resp 18 18 18 18


 


B/P (MAP) 107/63 (78) 103/60 (74) 105/64 (78) 115/66 (82)


 


O2 Delivery Room Air Room Air Room Air Room Air





 3/10/22 3/10/22 3/10/22 3/10/22





 11:58 12:12 12:28 12:42


 


Temp   36.6 


 


Pulse 70 76 72 66


 


Resp 18 18 18 18


 


B/P (MAP) 106/60 (75) 106/62 (77) 105/64 (78) 110/63 (79)


 


O2 Delivery Room Air Room Air Room Air Room Air


 


    





 3/10/22 3/10/22 3/10/22 3/10/22





 12:58 13:12 13:27 13:43


 


Temp    36.7


 


Pulse 78 65 65 66


 


Resp 18 18 18 18


 


B/P (MAP) 115/55 (75) 107/58 (74) 112/65 (81) 111/71 (84)


 


O2 Delivery Room Air Room Air Room Air Room Air


 


    





 3/10/22 3/10/22 3/10/22 3/10/22





 13:58 14:12 14:15 14:18


 


Pulse 85 78 79 75


 


Resp 18 18 18 18


 


B/P (MAP) 117/67 (84) 118/70 (86) 130/78 (95) 110/72 (85)


 


Pulse Ox   99 


 


O2 Delivery Room Air Room Air Room Air Room Air





 3/10/22 3/10/22 3/10/22 3/10/22





 14:21 14:24 14:27 14:30


 


Pulse 79 84 75 79


 


Resp 18 18 18 18


 


B/P (MAP) 127/62 (83) 121/82 (95) 123/83 (96) 115/70 (85)


 


Pulse Ox 99 98  98


 


O2 Delivery Room Air Room Air Room Air Room Air





 3/10/22 3/10/22 3/10/22 3/10/22





 14:33 14:36 14:39 14:42


 


Temp  36.6  


 


Pulse 75 79 77 79


 


Resp 18 18 18 18


 


B/P (MAP) 112/67 (82) 117/68 (84) 113/68 (83) 109/65 (80)


 


Pulse Ox  98  99


 


O2 Delivery Room Air Room Air Room Air Room Air


 


    





 3/10/22 3/10/22 3/10/22 3/10/22





 14:45 14:48 14:53 15:00


 


Pulse 82 81 91 101


 


Resp 18 18 18 18


 


B/P (MAP) 110/65 (80) 121/79 (93) 111/76 (88) 115/76 (89)


 


Pulse Ox 100  100 100


 


O2 Delivery Room Air Room Air Room Air Room Air





 3/10/22 3/10/22 3/10/22 3/10/22





 15:20 15:24 15:29 15:34


 


Pulse 142 97 95 102


 


Resp 18 18 18 18


 


B/P (MAP) 137/98 (111) 110/56 (74) 122/59 (80) 115/56 (75)


 


O2 Delivery Room Air Room Air Room Air Room Air





 3/10/22 3/10/22 3/10/22 3/10/22





 15:40 15:44 15:50 15:55


 


Pulse 92 95 102 98


 


Resp 18 18 18 18


 


B/P (MAP) 110/58 (75) 109/57 (74) 115/68 (84) 130/63 (85)


 


O2 Delivery Room Air Room Air Room Air Room Air





 3/10/22 3/10/22 3/10/22 3/10/22





 16:03 16:08 16:27 16:42


 


Pulse 86 85 77 72


 


Resp 18 18 18 18


 


B/P (MAP) 116/55 (75) 115/65 (82) 118/63 (81) 118/77 (91)


 


O2 Delivery Room Air Room Air Room Air Room Air





 3/10/22 3/10/22 3/10/22 3/10/22





 16:57 17:12 17:27 17:42


 


Temp 37.0   


 


Pulse 81 94 90 73


 


Resp 18 18 18 18


 


B/P (MAP) 126/61 (82) 114/70 (85) 117/80 (92) 106/62 (77)


 


O2 Delivery Room Air Room Air Room Air Room Air


 


    





 3/10/22 3/10/22 3/10/22 3/11/22





 17:57 18:12 20:31 00:32


 


Temp   37.1 36.4


 


Pulse 70 71 63 59


 


Resp 18 18 18 18


 


B/P (MAP) 109/63 (78) 105/63 (77) 113/65 (81) 105/67 (80)


 


Pulse Ox   98 98


 


O2 Delivery Room Air Room Air Room Air Room Air


 


    





 3/11/22 3/11/22  





 04:00 10:11  


 


Temp 36.7 37.0  


 


Pulse 67 71  


 


Resp 18 18  


 


B/P (MAP) 99/59 (72) 101/67 (78)  


 


Pulse Ox  97  


 


O2 Delivery Room Air Room Air  





Vital signs are stable.  Patient is afebrile.





Fundus is firm below the umbilicus and nontender.


Extremities show no clubbing or cyanosis.  There is no Homans' sign.





Assessment and plan   Postpartum day #1 status post term spontaneous vaginal 

delivery doing well.  Plan is for routine convalescent care


Patient likely will be discharged home tomorrow


Final Diagnosis


38-week spontaneous vaginal delivery











GERI BISWAS MD       Mar 11, 2022 12:23

## 2022-03-11 NOTE — DISCHARGE INST-SIMPLE/STANDARD
Discharge Inst-Standard


Reconcile Patient Problems


Problems Reviewed?:  Yes





Discharge Medications


New, Converted or Re-Newed RX:  Other





Patient Instructions/Follow Up


Plan of Care/Instructions/FU:  


As directed


Activity as Tolerated:  Yes


Discharge Diet:  No Restrictions (As directed)


Health Concerns:  


Routine recovery


[facility]:  


As directed


Other Inst to Patient





Follow Up Appt:


Call to make follow up appt. for patient in 4 weeks.





Activity 


Per routine post vaginal delivery instructions.





Diet as tolerated





Patient may shower or tub bathe as desired.











GERI BISWAS MD       Mar 11, 2022 12:32

## 2022-03-12 VITALS — SYSTOLIC BLOOD PRESSURE: 97 MMHG | DIASTOLIC BLOOD PRESSURE: 56 MMHG

## 2022-03-12 VITALS — DIASTOLIC BLOOD PRESSURE: 53 MMHG | SYSTOLIC BLOOD PRESSURE: 91 MMHG

## 2022-03-12 VITALS — SYSTOLIC BLOOD PRESSURE: 106 MMHG | DIASTOLIC BLOOD PRESSURE: 67 MMHG

## 2022-03-12 RX ADMIN — IBUPROFEN SCH MG: 800 TABLET, FILM COATED ORAL at 05:42

## 2022-03-12 RX ADMIN — DOCUSATE SODIUM SCH MG: 100 CAPSULE ORAL at 09:12

## 2022-03-12 RX ADMIN — IBUPROFEN SCH MG: 800 TABLET, FILM COATED ORAL at 12:56

## 2022-03-12 RX ADMIN — IBUPROFEN SCH MG: 800 TABLET, FILM COATED ORAL at 00:00

## 2022-03-12 NOTE — POSTPARTUM PROGRESS NOTE
Postpartum Note


Postpartum Note


Postpartum Day # 2





Subjective:


Patient is without complaints. Ambulating, voiding. Tolerating a regular diet 

without nausea or vomiting. Normal lochia. Pain is well controlled with oral 

pain medications. Bottlefeeding. 





Objective:


                          VS - Last 72 Hours, by Label








 3/10/22 3/10/22 3/10/22 3/10/22





 07:24 07:48 09:49 09:54


 


Temp 36.2 36.2 36.8 37.3


 


Pulse 85 85 78 85


 


Resp 18 18 18 18


 


B/P (MAP) 107/76 (86)  103/59 105/60


 


Pulse Ox 98 98 97 97


 


O2 Delivery Room Air Room Air Room Air Room Air


 


    





 3/10/22 3/10/22 3/10/22 3/10/22





 10:04 10:22 10:26 10:42


 


Temp 37.2 37.1 37.0 


 


Pulse 71 79 73 76


 


Resp 18 18 18 18


 


B/P (MAP) 110/65 103/65 107/68 111/69 (83)


 


Pulse Ox 97 98 97 


 


O2 Delivery Room Air Room Air Room Air Room Air


 


    





 3/10/22 3/10/22 3/10/22 3/10/22





 10:58 11:12 11:28 11:42


 


Pulse 76 74 75 75


 


Resp 18 18 18 18


 


B/P (MAP) 107/63 (78) 103/60 (74) 105/64 (78) 115/66 (82)


 


O2 Delivery Room Air Room Air Room Air Room Air





 3/10/22 3/10/22 3/10/22 3/10/22





 11:58 12:12 12:28 12:42


 


Temp   36.6 


 


Pulse 70 76 72 66


 


Resp 18 18 18 18


 


B/P (MAP) 106/60 (75) 106/62 (77) 105/64 (78) 110/63 (79)


 


O2 Delivery Room Air Room Air Room Air Room Air


 


    





 3/10/22 3/10/22 3/10/22 3/10/22





 12:58 13:12 13:27 13:43


 


Temp    36.7


 


Pulse 78 65 65 66


 


Resp 18 18 18 18


 


B/P (MAP) 115/55 (75) 107/58 (74) 112/65 (81) 111/71 (84)


 


O2 Delivery Room Air Room Air Room Air Room Air


 


    





 3/10/22 3/10/22 3/10/22 3/10/22





 13:58 14:12 14:15 14:18


 


Pulse 85 78 79 75


 


Resp 18 18 18 18


 


B/P (MAP) 117/67 (84) 118/70 (86) 130/78 (95) 110/72 (85)


 


Pulse Ox   99 


 


O2 Delivery Room Air Room Air Room Air Room Air





 3/10/22 3/10/22 3/10/22 3/10/22





 14:21 14:24 14:27 14:30


 


Pulse 79 84 75 79


 


Resp 18 18 18 18


 


B/P (MAP) 127/62 (83) 121/82 (95) 123/83 (96) 115/70 (85)


 


Pulse Ox 99 98  98


 


O2 Delivery Room Air Room Air Room Air Room Air





 3/10/22 3/10/22 3/10/22 3/10/22





 14:33 14:36 14:39 14:42


 


Temp  36.6  


 


Pulse 75 79 77 79


 


Resp 18 18 18 18


 


B/P (MAP) 112/67 (82) 117/68 (84) 113/68 (83) 109/65 (80)


 


Pulse Ox  98  99


 


O2 Delivery Room Air Room Air Room Air Room Air


 


    





 3/10/22 3/10/22 3/10/22 3/10/22





 14:45 14:48 14:53 15:00


 


Pulse 82 81 91 101


 


Resp 18 18 18 18


 


B/P (MAP) 110/65 (80) 121/79 (93) 111/76 (88) 115/76 (89)


 


Pulse Ox 100  100 100


 


O2 Delivery Room Air Room Air Room Air Room Air





 3/10/22 3/10/22 3/10/22 3/10/22





 15:20 15:24 15:29 15:34


 


Pulse 142 97 95 102


 


Resp 18 18 18 18


 


B/P (MAP) 137/98 (111) 110/56 (74) 122/59 (80) 115/56 (75)


 


O2 Delivery Room Air Room Air Room Air Room Air





 3/10/22 3/10/22 3/10/22 3/10/22





 15:40 15:44 15:50 15:55


 


Pulse 92 95 102 98


 


Resp 18 18 18 18


 


B/P (MAP) 110/58 (75) 109/57 (74) 115/68 (84) 130/63 (85)


 


O2 Delivery Room Air Room Air Room Air Room Air





 3/10/22 3/10/22 3/10/22 3/10/22





 16:03 16:08 16:27 16:42


 


Pulse 86 85 77 72


 


Resp 18 18 18 18


 


B/P (MAP) 116/55 (75) 115/65 (82) 118/63 (81) 118/77 (91)


 


O2 Delivery Room Air Room Air Room Air Room Air





 3/10/22 3/10/22 3/10/22 3/10/22





 16:57 17:12 17:27 17:42


 


Temp 37.0   


 


Pulse 81 94 90 73


 


Resp 18 18 18 18


 


B/P (MAP) 126/61 (82) 114/70 (85) 117/80 (92) 106/62 (77)


 


O2 Delivery Room Air Room Air Room Air Room Air


 


    





 3/10/22 3/10/22 3/10/22 3/11/22





 17:57 18:12 20:31 00:32


 


Temp   37.1 36.4


 


Pulse 70 71 63 59


 


Resp 18 18 18 18


 


B/P (MAP) 109/63 (78) 105/63 (77) 113/65 (81) 105/67 (80)


 


Pulse Ox   98 98


 


O2 Delivery Room Air Room Air Room Air Room Air


 


    





 3/11/22 3/11/22 3/11/22 3/11/22





 04:00 10:11 15:28 18:13


 


Temp 36.7 37.0 36.5 36.8


 


Pulse 67 71 57 65


 


Resp 18 18 16 16


 


B/P (MAP) 99/59 (72) 101/67 (78) 110/66 (81) 108/66 (80)


 


Pulse Ox  97 97 98


 


O2 Delivery Room Air Room Air Room Air Room Air


 


    





 3/12/22 3/12/22  





 00:16 05:41  


 


Temp 36.2 36.3  


 


Pulse 58 56  


 


Resp 16 16  


 


B/P (MAP) 106/67 (80) 91/53 (66)  


 


Pulse Ox 97 98  


 


O2 Delivery Room Air Room Air  











Physical Exam:


General - Alert and oriented, no apparent distress


Abdomen - Soft, appropriately tender to palpation, non-distended, fundus firm at

umbilicus


Extremities - no edema, negative Fern's bilaterally 





Assessment:


Post-partum day # 2, status post vaginal delivery.


Recovering well, hemodynamically stable





Plan:


Routine postpartum care.


Viable male infant. Circ desired. Bottlefeeding. 


Heme: Vitals stable. No s/s of anemia. Ferrous sulfate supplementation.


Encourage ambulation.


Plan for discharge today.





Vitals - Labs


Vital Signs - I&O





Vital Signs








  Date Time  Temp Pulse Resp B/P (MAP) Pulse Ox O2 Delivery O2 Flow Rate FiO2


 


3/12/22 05:41 36.3 56 16 91/53 (66) 98 Room Air  


 


3/12/22 00:16 36.2 58 16 106/67 (80) 97 Room Air  


 


3/11/22 18:13 36.8 65 16 108/66 (80) 98 Room Air  


 


3/11/22 15:28 36.5 57 16 110/66 (81) 97 Room Air  

















ZENAIDA TAM MD              Mar 12, 2022 10:25